# Patient Record
Sex: FEMALE | Race: BLACK OR AFRICAN AMERICAN | NOT HISPANIC OR LATINO | Employment: OTHER | RURAL
[De-identification: names, ages, dates, MRNs, and addresses within clinical notes are randomized per-mention and may not be internally consistent; named-entity substitution may affect disease eponyms.]

---

## 2023-12-31 ENCOUNTER — OFFICE VISIT (OUTPATIENT)
Dept: FAMILY MEDICINE | Facility: CLINIC | Age: 78
End: 2023-12-31
Payer: MEDICARE

## 2023-12-31 VITALS
WEIGHT: 137 LBS | BODY MASS INDEX: 21.5 KG/M2 | HEIGHT: 67 IN | SYSTOLIC BLOOD PRESSURE: 120 MMHG | OXYGEN SATURATION: 98 % | TEMPERATURE: 99 F | DIASTOLIC BLOOD PRESSURE: 73 MMHG | RESPIRATION RATE: 18 BRPM | HEART RATE: 88 BPM

## 2023-12-31 DIAGNOSIS — R93.6 ABNORMAL X-RAY OF KNEE: Primary | ICD-10-CM

## 2023-12-31 DIAGNOSIS — M19.031 ARTHRITIS OF RIGHT WRIST: ICD-10-CM

## 2023-12-31 DIAGNOSIS — M25.531 RIGHT WRIST PAIN: ICD-10-CM

## 2023-12-31 DIAGNOSIS — M25.461 EFFUSION, RIGHT KNEE: ICD-10-CM

## 2023-12-31 DIAGNOSIS — Z11.59 ENCOUNTER FOR SCREENING FOR OTHER VIRAL DISEASES: Primary | ICD-10-CM

## 2023-12-31 DIAGNOSIS — M25.561 ACUTE PAIN OF RIGHT KNEE: ICD-10-CM

## 2023-12-31 DIAGNOSIS — R05.9 COUGH, UNSPECIFIED TYPE: ICD-10-CM

## 2023-12-31 LAB
CTP QC/QA: YES
CTP QC/QA: YES
FLUAV AG NPH QL: NEGATIVE
FLUBV AG NPH QL: NEGATIVE
SARS-COV-2 RDRP RESP QL NAA+PROBE: NEGATIVE

## 2023-12-31 PROCEDURE — 87635 SARS-COV-2 COVID-19 AMP PRB: CPT | Mod: RHCUB | Performed by: NURSE PRACTITIONER

## 2023-12-31 PROCEDURE — 99204 OFFICE O/P NEW MOD 45 MIN: CPT | Mod: ,,, | Performed by: NURSE PRACTITIONER

## 2023-12-31 PROCEDURE — 87804 INFLUENZA ASSAY W/OPTIC: CPT | Mod: QW,RHCUB | Performed by: NURSE PRACTITIONER

## 2023-12-31 PROCEDURE — 99204 PR OFFICE/OUTPT VISIT, NEW, LEVL IV, 45-59 MIN: ICD-10-PCS | Mod: ,,, | Performed by: NURSE PRACTITIONER

## 2023-12-31 RX ORDER — BENZONATATE 200 MG/1
200 CAPSULE ORAL 3 TIMES DAILY PRN
Qty: 30 CAPSULE | Refills: 0 | Status: SHIPPED | OUTPATIENT
Start: 2023-12-31 | End: 2024-01-10

## 2023-12-31 NOTE — PROGRESS NOTES
"Subjective:       Patient ID: Karina Eddy is a 78 y.o. female.    Chief Complaint: Cough (Ongoing cough since noah /Pt is being swab for covid/flu while in clinic ), Wrist Pain (R wrist /Pt requesting to get a xray while in clinic/Pt denies injury ), and Knee Pain (R knee pain /Pt requesting to get a xray while in clinic/)    Presents to clinic with daughter as above. Has had a dry cough for a week. States sometimes has sinus drainage. No fever. No GERD. Denies SOB. Also c/o right wrist and right knee pain. Thinks may have bumped wrist a few weeks ago. Knee pain has been going on for a while no injury.       Review of Systems   Constitutional: Negative.    HENT:  Positive for congestion. Negative for sore throat.    Respiratory:  Positive for cough.    Cardiovascular: Negative.    Musculoskeletal:  Positive for joint pain.          Reviewed family, medical, surgical, and social history.    Objective:      /73 (BP Location: Left arm, Patient Position: Sitting, BP Method: Small (Manual))   Pulse 88   Temp 98.8 °F (37.1 °C) (Oral)   Resp 18   Ht 5' 7" (1.702 m)   Wt 62.1 kg (137 lb)   SpO2 98%   BMI 21.46 kg/m²   Physical Exam  Vitals and nursing note reviewed.   Constitutional:       General: She is not in acute distress.     Appearance: Normal appearance. She is not ill-appearing, toxic-appearing or diaphoretic.   HENT:      Head: Normocephalic.      Right Ear: Tympanic membrane, ear canal and external ear normal.      Left Ear: Tympanic membrane, ear canal and external ear normal.      Nose: Nose normal.      Mouth/Throat:      Mouth: Mucous membranes are moist.      Pharynx: No oropharyngeal exudate or posterior oropharyngeal erythema.   Cardiovascular:      Rate and Rhythm: Normal rate and regular rhythm.      Heart sounds: Normal heart sounds.   Pulmonary:      Effort: Pulmonary effort is normal. No respiratory distress.      Breath sounds: Normal breath sounds. No stridor. No wheezing, rhonchi " or rales.   Chest:      Chest wall: No tenderness.   Musculoskeletal:      Right wrist: Tenderness present. No swelling, deformity, effusion, lacerations, bony tenderness, snuff box tenderness or crepitus. Normal range of motion. Normal pulse.      Cervical back: Normal range of motion and neck supple.      Right knee: Swelling, effusion, bony tenderness and crepitus present. No deformity, erythema, ecchymosis or lacerations. Decreased range of motion.      Comments: Mild tenderness on medial right wrist. No redness/swelling/bruising. No redness/wamrth/bruising to right knee. Probable effusion lateral and medial patella.    Skin:     General: Skin is warm and dry.      Capillary Refill: Capillary refill takes less than 2 seconds.   Neurological:      Mental Status: She is alert and oriented to person, place, and time.   Psychiatric:         Mood and Affect: Mood normal.         Behavior: Behavior normal.         Thought Content: Thought content normal.         Judgment: Judgment normal.            Office Visit on 12/31/2023   Component Date Value Ref Range Status    Rapid Influenza A Ag 12/31/2023 Negative  Negative Final    Rapid Influenza B Ag 12/31/2023 Negative  Negative Final     Acceptable 12/31/2023 Yes   Final    POC Rapid COVID 12/31/2023 Negative  Negative Final     Acceptable 12/31/2023 Yes   Final      Assessment:       1. Encounter for screening for other viral diseases    2. Cough, unspecified type    3. Right wrist pain    4. Acute pain of right knee        Plan:       Encounter for screening for other viral diseases  -     POCT Influenza A/B  -     POCT COVID-19 Rapid Screening    Cough, unspecified type  -     X-Ray Chest PA And Lateral; Future; Expected date: 12/31/2023  -     benzonatate (TESSALON) 200 MG capsule; Take 1 capsule (200 mg total) by mouth 3 (three) times daily as needed for Cough.  Dispense: 30 capsule; Refill: 0    Right wrist pain  -     X-Ray Wrist  Complete 3 views Right; Future; Expected date: 12/31/2023    Acute pain of right knee  -     X-Ray Knee 1 or 2 View Right; Future; Expected date: 12/31/2023    I will call with xray results. Phone number verified.  RTC PRN          Risks, benefits, and side effects were discussed with the patient. All questions were answered to the fullest satisfaction of the patient, and pt verbalized understanding and agreement to treatment plan. Pt was to call with any new or worsening symptoms, or present to the ER.

## 2024-01-08 ENCOUNTER — OFFICE VISIT (OUTPATIENT)
Dept: ORTHOPEDICS | Facility: CLINIC | Age: 79
End: 2024-01-08
Payer: MEDICARE

## 2024-01-08 DIAGNOSIS — M17.11 PRIMARY OSTEOARTHRITIS OF RIGHT KNEE: Primary | ICD-10-CM

## 2024-01-08 DIAGNOSIS — M19.031 ARTHRITIS OF RIGHT WRIST: ICD-10-CM

## 2024-01-08 DIAGNOSIS — R93.6 ABNORMAL X-RAY OF KNEE: ICD-10-CM

## 2024-01-08 DIAGNOSIS — M65.4 DE QUERVAIN'S TENOSYNOVITIS, RIGHT: ICD-10-CM

## 2024-01-08 DIAGNOSIS — M25.461 EFFUSION, RIGHT KNEE: ICD-10-CM

## 2024-01-08 PROCEDURE — 99213 OFFICE O/P EST LOW 20 MIN: CPT | Mod: PBBFAC,25 | Performed by: NURSE PRACTITIONER

## 2024-01-08 PROCEDURE — 99213 OFFICE O/P EST LOW 20 MIN: CPT | Mod: 25,S$PBB,, | Performed by: NURSE PRACTITIONER

## 2024-01-08 PROCEDURE — 20610 DRAIN/INJ JOINT/BURSA W/O US: CPT | Mod: PBBFAC | Performed by: NURSE PRACTITIONER

## 2024-01-08 NOTE — PATIENT INSTRUCTIONS
Safety guidelines and activity restrictions discussed with the patient.  She verbalized understanding.  She was scheduled for CT examination of her knee on 01/16/2024.  Have her keep that appointment.  The physical exam does not give concern for tibial plateau fracture.  She was offered arthrocentesis of her right knee wished proceed.  Arthrocentesis performed yielding 90 cc of xanthochromic fluid.  We will place her in a thumb spica splint on the right.  Once we get CT results we will have return to orthopedic clinic and discussed possible intervention for de Quervain tenosynovitis with Dr. Colton Stevenson.

## 2024-01-08 NOTE — PROGRESS NOTES
79-year-old female presents ambulatory orthopedic clinic complaint of pain in her right knee.  Also complained of pain in her right wrist.  Wrist has been hurting for proximally 1 year.  The knee has been intermittent over the last several months.  She was seen by her primary care provider on 12/31/2023 where x-rays were obtained of the right knee AP and lateral view which showed severe DJD changes.  Radiologist raises the question of possible tibial plateau fracture.  She was scheduled for CT of her knee and referred to orthopedics.  She was not had CT performed as of yet.  X-rays of her right wrist at same date shows moderate DJD changes.  No evidence of acute fracture, dislocation or pathologic bone noted.      PE:  Physical exam she is noted be ambulating with a slight antalgic limp on the right.  In general she is awake, alert oriented appropriate.  No acute distress noted.  Respirations even unlabored.  Skin is warm dry and intact.  Physical exam of the right knee skin is warm dry and intact.  There has no soft tissue swelling noted.  There is a 2+ intra-articular effusion appreciated clinically.  Range motion right knee 0° extension with further flexion approximately 110°.  Valgus stress test is negative.  Varus stress test negative.  Anterior drawer test negative.  No tenderness palpation quad tendon.  There has no tenderness palpation patella tendon.  There has no tenderness to palpation of the medial joint line.  There has no tenderness palpation lateral joint line.  There has no tenderness to palpation about the proximal lateral tibial plateau region.  Physical exam of the right wrist she has limited range of motion of both wrist bilaterally.  Right radial pulse is 2 out of 4.  Finkelstein test reproduces pain symptoms on the right.  There is tenderness to palpation over the 1st extensor dorsal compartment of the right.    Impression:  1.)  DJD knee-right 2.)  De Quervain tenosynovitis-right    Plan:   Safety guidelines and activity restrictions discussed with the patient.  She verbalized understanding.  She was scheduled for CT examination of her knee on 01/16/2024.  Have her keep that appointment.  The physical exam does not give concern for tibial plateau fracture.  She was offered arthrocentesis of her right knee wished proceed.  Arthrocentesis performed yielding 90 cc of xanthochromic fluid.  We will place her in a thumb spica splint on the right.  Once we get CT results we will have return to orthopedic clinic and discussed possible intervention for de Quervain tenosynovitis with Dr. Colton Stevenson.

## 2024-01-08 NOTE — PROCEDURES
Large Joint Aspiration/Injection: R knee    Date/Time: 1/8/2024 1:00 PM    Performed by: Albino Montalvo FNP  Authorized by: Albino Montalvo FNP    Consent Done?:  Yes (Verbal)  Indications:  Pain  Site marked: the procedure site was marked    Timeout: prior to procedure the correct patient, procedure, and site was verified      Local anesthesia used?: Yes    Local anesthetic:  Topical anesthetic    Details:  Needle Size:  18 G  Approach:  Lateral  Location:  Knee  Site:  R knee  Aspirate amount (mL):  90  Aspirate:  Clear and yellow  Patient tolerance:  Patient tolerated the procedure well with no immediate complications     Right knee prepped with Betadine

## 2024-01-16 ENCOUNTER — HOSPITAL ENCOUNTER (OUTPATIENT)
Dept: RADIOLOGY | Facility: HOSPITAL | Age: 79
Discharge: HOME | End: 2024-01-16
Attending: NURSE PRACTITIONER
Payer: MEDICARE

## 2024-01-16 DIAGNOSIS — R93.6 ABNORMAL X-RAY OF KNEE: ICD-10-CM

## 2024-01-16 DIAGNOSIS — M25.461 EFFUSION, RIGHT KNEE: ICD-10-CM

## 2024-01-16 PROCEDURE — 73700 CT LOWER EXTREMITY W/O DYE: CPT | Mod: 26,RT,, | Performed by: STUDENT IN AN ORGANIZED HEALTH CARE EDUCATION/TRAINING PROGRAM

## 2024-01-16 PROCEDURE — 73700 CT LOWER EXTREMITY W/O DYE: CPT | Mod: TC,RT

## 2024-01-18 ENCOUNTER — OFFICE VISIT (OUTPATIENT)
Dept: ORTHOPEDICS | Facility: CLINIC | Age: 79
End: 2024-01-18
Payer: MEDICARE

## 2024-01-18 ENCOUNTER — HOSPITAL ENCOUNTER (OUTPATIENT)
Dept: RADIOLOGY | Facility: HOSPITAL | Age: 79
Discharge: HOME | End: 2024-01-18
Attending: NURSE PRACTITIONER
Payer: MEDICARE

## 2024-01-18 VITALS — WEIGHT: 137 LBS | HEIGHT: 67 IN | BODY MASS INDEX: 21.5 KG/M2

## 2024-01-18 DIAGNOSIS — R22.41 MASS OF RIGHT KNEE: ICD-10-CM

## 2024-01-18 DIAGNOSIS — M89.8X5 PAIN IN RIGHT FEMUR: Primary | ICD-10-CM

## 2024-01-18 DIAGNOSIS — M65.4 DE QUERVAIN'S TENOSYNOVITIS, RIGHT: Primary | ICD-10-CM

## 2024-01-18 DIAGNOSIS — Z13.9 ENCOUNTER FOR SCREENING: Primary | ICD-10-CM

## 2024-01-18 DIAGNOSIS — M89.8X5 PAIN IN RIGHT FEMUR: ICD-10-CM

## 2024-01-18 DIAGNOSIS — R93.89 ABNORMAL CT SCAN: ICD-10-CM

## 2024-01-18 PROCEDURE — 73552 X-RAY EXAM OF FEMUR 2/>: CPT | Mod: TC,RT

## 2024-01-18 PROCEDURE — 20550 NJX 1 TENDON SHEATH/LIGAMENT: CPT | Mod: PBBFAC,RT | Performed by: ORTHOPAEDIC SURGERY

## 2024-01-18 PROCEDURE — 99999PBSHW PR PBB SHADOW TECHNICAL ONLY FILED TO HB: Mod: PBBFAC,,,

## 2024-01-18 PROCEDURE — 73552 X-RAY EXAM OF FEMUR 2/>: CPT | Mod: 26,RT,, | Performed by: STUDENT IN AN ORGANIZED HEALTH CARE EDUCATION/TRAINING PROGRAM

## 2024-01-18 PROCEDURE — 99212 OFFICE O/P EST SF 10 MIN: CPT | Mod: PBBFAC | Performed by: ORTHOPAEDIC SURGERY

## 2024-01-18 PROCEDURE — 99213 OFFICE O/P EST LOW 20 MIN: CPT | Mod: PBBFAC,25 | Performed by: NURSE PRACTITIONER

## 2024-01-18 PROCEDURE — 99499 UNLISTED E&M SERVICE: CPT | Mod: S$PBB,,, | Performed by: NURSE PRACTITIONER

## 2024-01-18 PROCEDURE — 99213 OFFICE O/P EST LOW 20 MIN: CPT | Mod: S$PBB,25,, | Performed by: ORTHOPAEDIC SURGERY

## 2024-01-18 RX ORDER — TRIAMCINOLONE ACETONIDE 40 MG/ML
40 INJECTION, SUSPENSION INTRA-ARTICULAR; INTRAMUSCULAR
Status: DISCONTINUED | OUTPATIENT
Start: 2024-01-18 | End: 2024-01-18 | Stop reason: HOSPADM

## 2024-01-18 RX ADMIN — TRIAMCINOLONE ACETONIDE 40 MG: 400 INJECTION, SUSPENSION INTRA-ARTICULAR; INTRAMUSCULAR at 02:01

## 2024-01-18 NOTE — PROGRESS NOTES
79-year-old female presents back today for CT results of her right knee.  CT showed DJD changes.  She also has complaint of pain in her right femur.  She denies any injury or trauma.  She also has de Quervain's tenosynovitis is interested in talking to Dr. Colton Stevenson about possible surgical intervention.      X-ray: X-rays today of the right femur AP lateral view shows no evidence acute fracture, dislocation pathologic bone.      PE:  Physical exam she noted be ambulating independent no perceptible limp.  Physical exam right lower extremity she has good motion range of her hip without elicitation of pain.  Straight leg raise is negative.  There has no tenderness palpation about the thigh.    Impression:  1.)  De Quervain's tenosynovitis 2.)  Right thigh pain     Plan:  Safety guidelines activity restrictions cusp patient.  Verbalized understanding.  We will schedule her to see Dr. Colton Stevenson today to discuss possible surgical intervention.

## 2024-01-18 NOTE — PATIENT INSTRUCTIONS
Safety guidelines activity restrictions cusp patient.  Verbalized understanding.  We will schedule her to see Dr. Colton Stevenson today to discuss possible surgical intervention.

## 2024-01-18 NOTE — PROGRESS NOTES
79 y.o. Female returns to clinic for a follow up visit regarding     ICD-10-CM ICD-9-CM   1. De Quervain's tenosynovitis, right  M65.4 727.04        Patient saw Albino earlier today. She is here to discuss possible treatment options.   She has been wearing a removable brace since Oct.        No past medical history on file.  No past surgical history on file.      PHYSICAL EXAMINATION:                Right Hand/Wrist Exam     Pain   Hand - The patient exhibits pain of the extensory musculature.    Tests   Finkelstein's test: positive  Carpal Tunnel Compression Test: negative  Cubital Tunnel Compression Test: negative      Other     Neuorologic Exam    Median Distribution: normal  Ulnar Distribution: normal  Radial Distribution: normal          Vascular Exam       Capillary Refill  Right Hand: normal capillary refill            IMAGING:  X-Ray Femur 2 View Right    Result Date: 1/18/2024  EXAMINATION: XR FEMUR 2 VIEW RIGHT CLINICAL HISTORY: Other specified disorders of bone, thigh TECHNIQUE: XR FEMUR 2 VIEW RIGHT COMPARISON: 2024 FINDINGS: No acute fracture or dislocation. Mild degenerative change of the right hip.  Moderate tricompartmental degenerative change of the right knee.  Moderate knee joint effusion. No radiopaque foreign bodies.     No acute fracture or dislocation. Mild degenerative change of the right hip. Moderate tricompartmental degenerative change of the right knee.  Moderate knee joint effusion. Electronically signed by: José Luis Cisneros Date:    01/18/2024 Time:    14:35    CT Knee Without Contrast Right    Result Date: 1/16/2024  EXAMINATION: CT KNEE WITHOUT CONTRAST RIGHT CLINICAL HISTORY: Fracture, knee;Abnormal findings on diagnostic imaging of limbs TECHNIQUE: CT KNEE WITHOUT CONTRAST RIGHT COMPARISON: 12/31/2023 FINDINGS: Moderate narrowing of the medial and lateral compartments.  Mild-to-moderate narrowing of the patellofemoral compartments.  Productive change of the 3 compartments of the  right knee. Subchondral cystic change located about the midline of the proximal tibia near the tibial eminence.  The superior aspect of the subchondral cyst located near the tibial eminence is fractured although this could be chronic in etiology, series 4, image 34. Moderately sized knee joint effusion. The ACL is poorly visualized.  The PCL is intact.  The menisci are extruded bilaterally. Soft tissue thickening located within the proximal diaphysis of the proximal tibia measuring 2.0 x 1.1 x 1.6 cm, series 4, image 29. 6 cm Baker's cyst. Calcified vascular disease.  No fluid collection.     Soft tissue thickening located within the proximal diaphysis of the proximal tibia measuring 2.0 x 1.1 x 1.6 cm, series 4, image 29.  MRI of the right knee without and with intravenous contrast recommended. Subchondral cystic change located about the midline of the proximal tibia near the tibial eminence.  The superior aspect of the subchondral cyst located near the tibial eminence is fractured although this could be chronic in etiology, series 4, image 34. The ACL is poorly visualized. The PCL is intact. The menisci are extruded bilaterally. Moderate narrowing of the medial and lateral compartments.  Mild-to-moderate narrowing of the patellofemoral compartments.  Productive change of the 3 compartments of the right knee. Moderately sized knee joint effusion. Electronically signed by: José Luis Cisneros Date:    01/16/2024 Time:    15:48    X-Ray Knee 1 or 2 View Right    Result Date: 12/31/2023  EXAMINATION: XR KNEE 1 OR 2 VIEW RIGHT CLINICAL HISTORY: Pain in right knee TECHNIQUE: XR KNEE 1 OR 2 VIEW RIGHT COMPARISON: None FINDINGS: Questionable nondisplaced fracture involving the lateral tibial plateau, CT of the right knee recommended. Moderate to severe degenerative change of the right knee No radiopaque foreign bodies.     Questionable nondisplaced fracture involving the lateral tibial plateau, CT of the right knee  recommended. Moderate right knee joint effusion Moderate to severe degenerative change of the right knee Electronically signed by: José Luis Cisneros Date:    12/31/2023 Time:    13:13    X-Ray Chest PA And Lateral    Result Date: 12/31/2023  EXAMINATION: XR CHEST PA AND LATERAL CLINICAL HISTORY: Cough, unspecified TECHNIQUE: XR CHEST PA AND LATERAL COMPARISON: None FINDINGS: No lines or tubes. Lungs are clear. Normal pleura. Cardiac silhouette is normal No obvious acute bone findings.     No acute pulmonary disease Electronically signed by: José Luis Cisneros Date:    12/31/2023 Time:    12:54    X-Ray Wrist Complete 3 views Right    Result Date: 12/31/2023  EXAMINATION: XR WRIST COMPLETE 3 VIEWS RIGHT CLINICAL HISTORY: Pain in right wrist TECHNIQUE: XR WRIST COMPLETE 3 VIEWS RIGHT COMPARISON: None FINDINGS: No acute fracture or dislocation. Mild-to-moderate degenerative change of the wrist No radiopaque foreign bodies.     No acute findings Mild to moderate degenerative change of the wrist Electronically signed by: José Luis Cisneros Date:    12/31/2023 Time:    12:53       ASSESSMENT:      ICD-10-CM ICD-9-CM   1. De Quervain's tenosynovitis, right  M65.4 727.04       PLAN:     -Findings and treatment options were discussed with the patient  -All questions answered      Injection given today for right de Quervain tenosynovitis.  If no better in 4 weeks consider open release.  Risks benefits alternatives were discussed in great detail she voiced her understanding.  We will see back if pain symptoms persist    There are no Patient Instructions on file for this visit.      Orders Placed This Encounter   Procedures    Tendon Sheath         Procedures

## 2024-01-22 ENCOUNTER — TELEPHONE (OUTPATIENT)
Dept: FAMILY MEDICINE | Facility: CLINIC | Age: 79
End: 2024-01-22
Payer: MEDICARE

## 2024-01-22 NOTE — TELEPHONE ENCOUNTER
Notified daughter Anthony Watts of appt for MRI on 2/1/24 at 1300 at Imaging center. Voiced agreement.

## 2024-02-05 DIAGNOSIS — R22.41 MASS OF RIGHT KNEE: Primary | ICD-10-CM

## 2024-02-05 DIAGNOSIS — Z85.3 HISTORY OF BREAST CANCER: ICD-10-CM

## 2024-03-05 ENCOUNTER — OFFICE VISIT (OUTPATIENT)
Dept: ORTHOPEDICS | Facility: CLINIC | Age: 79
End: 2024-03-05
Payer: MEDICARE

## 2024-03-05 DIAGNOSIS — M17.11 PRIMARY OSTEOARTHRITIS OF RIGHT KNEE: Primary | ICD-10-CM

## 2024-03-05 PROCEDURE — 99213 OFFICE O/P EST LOW 20 MIN: CPT | Mod: S$PBB,25,, | Performed by: ORTHOPAEDIC SURGERY

## 2024-03-05 PROCEDURE — 99212 OFFICE O/P EST SF 10 MIN: CPT | Mod: PBBFAC,25 | Performed by: ORTHOPAEDIC SURGERY

## 2024-03-05 PROCEDURE — 99999PBSHW PR PBB SHADOW TECHNICAL ONLY FILED TO HB: Mod: JZ,PBBFAC,,

## 2024-03-05 PROCEDURE — 20610 DRAIN/INJ JOINT/BURSA W/O US: CPT | Mod: PBBFAC | Performed by: ORTHOPAEDIC SURGERY

## 2024-03-05 RX ORDER — BUPIVACAINE HYDROCHLORIDE 5 MG/ML
3 INJECTION, SOLUTION PERINEURAL
Status: DISCONTINUED | OUTPATIENT
Start: 2024-03-05 | End: 2024-03-05 | Stop reason: HOSPADM

## 2024-03-05 RX ADMIN — Medication 48 MG: at 11:03

## 2024-03-05 RX ADMIN — BUPIVACAINE HYDROCHLORIDE 3 ML: 5 INJECTION, SOLUTION PERINEURAL at 11:03

## 2024-03-05 NOTE — PROGRESS NOTES
ASSESSMENT:      ICD-10-CM ICD-9-CM   1. Primary osteoarthritis of right knee  M17.11 715.16       PLAN:     Findings and treatment options were discussed with the patient regarding the diagnosis.   All questions were answered regarding Karina Eddy 's painful knee.      Natural history and expected course discussed. Questions answered. Educational materials distributed. Rest, ice, compression, and elevation (RICE) therapy. Arthrocentesis. See procedure note.  Synvisc given today please see the attached procedure note.  Will follow up as needed.  There are no Patient Instructions on file for this visit.    IMAGING:   I reviewed her MRI which demonstrates an enchondroma this was a benign lesion she has severe tricompartmental osteoarthritis.      CC: Knee pain    79 y.o. Female who presents as a new patient to me for evaluation of  right knee pain.    Occupation: retired  Pain has been present for quite a while. She saw OSEI Dennison in Jan. At that visit he was able to draw fluid off her knee.   She reports that she now has pain radiating into her thigh.  Injury description No specific injury.   Mechanical symptoms, such as clicking, locking, and popping are present.    Swelling and effusions  are present.   The patient does  describe symptoms of knee instability, such as the knee buckling and the knee giving way.   Symptoms are worsened with activity.  Better with rest. Treatment thus far has included rest, activity modifications, and oral medications.    she  has not had formal physical therapy.  she has not had prior injections injections into the knee.   she has had previous advanced imaging such as MRI.     Here today to discuss diagnosis and treatment options.          REVIEW OF SYSTEMS:   Constitution: Negative. Negative for chills, fever and night sweats.    Hematologic/Lymphatic: Negative for bleeding problem. Does not bruise/bleed easily.   Skin: Negative for dry skin, itching and rash.    Musculoskeletal: Negative for falls. Positive for knee pain and muscle weakness.     All other review of symptoms were reviewed and found to be noncontributory.     PAST MEDICAL HISTORY:   History reviewed. No pertinent past medical history.    PAST SURGICAL HISTORY:   History reviewed. No pertinent surgical history.    FAMILY HISTORY:   History reviewed. No pertinent family history.    SOCIAL HISTORY:   Social History     Socioeconomic History    Marital status:    Tobacco Use    Smoking status: Never    Smokeless tobacco: Never       MEDICATIONS:   No current outpatient medications on file.    ALLERGIES:   Review of patient's allergies indicates:  No Known Allergies     PHYSICAL EXAMINATION:    General    Nursing note and vitals reviewed.  Constitutional: She is oriented to person, place, and time. She appears well-developed and well-nourished.   HENT:   Head: Normocephalic and atraumatic.   Nose: Nose normal.   Eyes: Pupils are equal, round, and reactive to light.   Neck: Neck supple.   Cardiovascular:  Normal rate, regular rhythm and intact distal pulses.            Pulmonary/Chest: Effort normal. No respiratory distress. She exhibits no tenderness.   Abdominal: Soft. She exhibits no distension. There is no abdominal tenderness.   Neurological: She is alert and oriented to person, place, and time. She has normal reflexes.   Psychiatric: She has a normal mood and affect. Her behavior is normal. Judgment and thought content normal.     General Musculoskeletal Exam   Gait: antalgic       Right Knee Exam     Inspection   Swelling: present  Effusion: present  Deformity: present    Tenderness   The patient is tender to palpation of the medial joint line.    Crepitus   The patient has crepitus of the patella and medial joint line.    Range of Motion   Extension:  abnormal   Flexion:  abnormal     Tests   Meniscus   Jean-Pierre:  Medial - positive   Ligament Examination   Lachman: normal (-1 to 2mm)    PCL-Posterior Drawer: normal (0 to 2mm)     MCL - Valgus: normal (0 to 2mm)  LCL - Varus: normal  Pivot Shift: normal (Equal)  Reverse Pivot Shift: normal (Equal)  Dial Test at 30 degrees: normal (< 5 degrees)  Dial Test at 90 degrees: normal (< 5 degrees)  Posterior Sag Test: negative  Posterolateral Corner: unstable (>15 degrees difference)  Patella   Patellar Tracking: normal  Q-Angle at 90 degrees: normal  Patellar Grind: positive    Other   Sensation: normal    Muscle Strength   Right Lower Extremity   Quadriceps:  5/5   Hamstrin/5     Reflexes     Right Side   Quadriceps:  2+    Vascular Exam     Right Pulses  Dorsalis Pedis:      2+  Posterior Tibial:      2+            Orders Placed This Encounter   Procedures    Large Joint Aspiration/Injection: R knee     This order was created via procedure documentation    Prior authorization Order     RIGHT KNEE     Order Specific Question:   What medications need authorization?     Answer:   SYNVISC-ONE [[id number]]     Order Specific Question:   Dose     Answer:   6 ML     Order Specific Question:   Frequency     Answer:   1     Order Specific Question:   J-Codes     Answer:   J [id number]       Procedures

## 2024-03-05 NOTE — PROCEDURES
Large Joint Aspiration/Injection: R knee    Date/Time: 3/5/2024 11:00 AM    Performed by: Colton Stevenson MD  Authorized by: Colton Stevenson MD    Consent Done?:  Yes (Verbal)  Indications:  Pain  Local anesthesia used?: No      Details:  Needle Size:  22 G  Approach:  Superior  Location:  Knee  Site:  R knee  Medications:  3 mL BUPivacaine 0.5 % (5 mg/mL); 48 mg hylan g-f 20 48 mg/6 mL  Patient tolerance:  Patient tolerated the procedure well with no immediate complications

## 2024-06-26 ENCOUNTER — HOSPITAL ENCOUNTER (OUTPATIENT)
Dept: RADIOLOGY | Facility: HOSPITAL | Age: 79
Discharge: HOME | End: 2024-06-26
Attending: EMERGENCY MEDICINE
Payer: MEDICARE

## 2024-06-26 ENCOUNTER — HOSPITAL ENCOUNTER (OUTPATIENT)
Dept: RADIOLOGY | Facility: HOSPITAL | Age: 79
Discharge: HOME | End: 2024-06-26
Attending: STUDENT IN AN ORGANIZED HEALTH CARE EDUCATION/TRAINING PROGRAM
Payer: MEDICARE

## 2024-06-26 ENCOUNTER — HOSPITAL ENCOUNTER (EMERGENCY)
Facility: HOSPITAL | Age: 79
Discharge: HOME | End: 2024-06-26
Attending: EMERGENCY MEDICINE
Payer: MEDICARE

## 2024-06-26 VITALS
WEIGHT: 123 LBS | SYSTOLIC BLOOD PRESSURE: 157 MMHG | DIASTOLIC BLOOD PRESSURE: 79 MMHG | RESPIRATION RATE: 22 BRPM | HEART RATE: 107 BPM | BODY MASS INDEX: 19.3 KG/M2 | TEMPERATURE: 99 F | OXYGEN SATURATION: 98 % | HEIGHT: 67 IN

## 2024-06-26 DIAGNOSIS — R92.8 ABNORMAL MAMMOGRAM: ICD-10-CM

## 2024-06-26 DIAGNOSIS — R92.8 ABNORMAL MAMMOGRAM: Primary | ICD-10-CM

## 2024-06-26 DIAGNOSIS — N63.10 MASS OF RIGHT BREAST, UNSPECIFIED QUADRANT: Primary | ICD-10-CM

## 2024-06-26 PROCEDURE — 99284 EMERGENCY DEPT VISIT MOD MDM: CPT | Mod: 25

## 2024-06-26 PROCEDURE — 76642 ULTRASOUND BREAST LIMITED: CPT | Mod: TC,RT

## 2024-06-26 PROCEDURE — 88377 M/PHMTRC ALYS ISHQUANT/SEMIQ: CPT | Mod: 26,,, | Performed by: PATHOLOGY

## 2024-06-26 PROCEDURE — 88305 TISSUE EXAM BY PATHOLOGIST: CPT | Mod: TC,91,SUR | Performed by: STUDENT IN AN ORGANIZED HEALTH CARE EDUCATION/TRAINING PROGRAM

## 2024-06-26 PROCEDURE — 88305 TISSUE EXAM BY PATHOLOGIST: CPT | Mod: 26,59,, | Performed by: PATHOLOGY

## 2024-06-26 PROCEDURE — 27000542 HC HANDPIECE BREAST BIOPSY, ANY

## 2024-06-26 PROCEDURE — 38505 NEEDLE BIOPSY LYMPH NODES: CPT

## 2024-06-26 PROCEDURE — 19083 BX BREAST 1ST LESION US IMAG: CPT | Mod: RT

## 2024-06-26 PROCEDURE — 27200940 HC BIOPSY TRAY

## 2024-06-26 PROCEDURE — A4648 IMPLANTABLE TISSUE MARKER: HCPCS

## 2024-06-26 PROCEDURE — 88305 TISSUE EXAM BY PATHOLOGIST: CPT | Mod: TC,SUR | Performed by: STUDENT IN AN ORGANIZED HEALTH CARE EDUCATION/TRAINING PROGRAM

## 2024-06-26 PROCEDURE — 27202044

## 2024-06-26 PROCEDURE — 88360 TUMOR IMMUNOHISTOCHEM/MANUAL: CPT | Mod: 26,,, | Performed by: PATHOLOGY

## 2024-06-26 PROCEDURE — 77061 BREAST TOMOSYNTHESIS UNI: CPT | Mod: TC,RT

## 2024-06-26 PROCEDURE — 88305 TISSUE EXAM BY PATHOLOGIST: CPT | Mod: 26,,, | Performed by: PATHOLOGY

## 2024-06-26 PROCEDURE — 88342 IMHCHEM/IMCYTCHM 1ST ANTB: CPT | Mod: 26,XU,, | Performed by: PATHOLOGY

## 2024-06-26 NOTE — ED PROVIDER NOTES
Encounter Date: 6/26/2024       History     Chief Complaint   Patient presents with    bleeding from R breast     States woke this am and had bleeding from under R breast -hx of breast CA with L breast Mastectomy     Patient is here complaining of some bleeding from her right breast since yesterday.  She 1st noticed this a month or 2 ago.  History is also taken from her daughter who lives in [place].  Her daughter plans on staying with her here until July 3rd or until have her how long it takes to get this under control.  Patient was not seen a doctor in years.  She has had about 20 lb weight loss over the past year according to her daughter he was just come back to visit her.  She was been doing well with good appetite.  Patient was had a prior mastectomy in the left breast back in the 1970s but she says it turned out retrospectively that she did not have breast cancer.      Review of patient's allergies indicates:  No Known Allergies  Past Medical History:   Diagnosis Date    Primary osteoarthritis of right knee 01/08/2024     History reviewed. No pertinent surgical history.  No family history on file.  Social History     Tobacco Use    Smoking status: Never    Smokeless tobacco: Never   Substance Use Topics    Alcohol use: Never    Drug use: Never     Review of Systems   Constitutional:  Negative for fever.   HENT:  Negative for sore throat.    Respiratory:  Negative for shortness of breath.    Cardiovascular:  Negative for chest pain.   Gastrointestinal:  Negative for nausea.   Genitourinary:  Negative for dysuria.   Musculoskeletal:  Negative for back pain.   Skin:  Negative for rash.   Neurological:  Negative for weakness.   Hematological:  Does not bruise/bleed easily.       Physical Exam     Initial Vitals [06/26/24 0553]   BP Pulse Resp Temp SpO2   (!) 157/79 107 (!) 22 99.2 °F (37.3 °C) 98 %      MAP       --         Physical Exam    Nursing note and vitals reviewed.  Constitutional: She appears  well-developed and well-nourished.   HENT:   Head: Normocephalic and atraumatic.   Eyes: EOM are normal. Pupils are equal, round, and reactive to light.   Neck: Neck supple. No thyromegaly present.   Normal range of motion.  Cardiovascular:  Normal rate, regular rhythm, normal heart sounds and intact distal pulses.           No murmur heard.  Pulmonary/Chest: Breath sounds normal. No respiratory distress. She has no wheezes.   Female CRNA chaperone present.  Patient has a large indurated and ulcerated mass on the right breast.  The indurated areas a proximally 6 cm with a about 2 cm ulceration.  Furthermore there is hard axillary lymphadenopathy.   Abdominal: Abdomen is soft. Bowel sounds are normal. She exhibits no distension. There is no abdominal tenderness.   Musculoskeletal:         General: No tenderness or edema. Normal range of motion.      Cervical back: Normal range of motion and neck supple.     Lymphadenopathy:     She has no cervical adenopathy.   Neurological: She is alert and oriented to person, place, and time. She has normal strength. No cranial nerve deficit or sensory deficit.   Skin: Skin is warm and dry. No rash noted.   Psychiatric: She has a normal mood and affect.         Medical Screening Exam   See Full Note    ED Course   Procedures  Labs Reviewed - No data to display       Imaging Results    None          Medications - No data to display  Medical Decision Making  Amount and/or Complexity of Data Reviewed  Independent Historian: caregiver  Discussion of management or test interpretation with external provider(s): Talked with general surgeon Dr. Méndez and radiologist Dr. Christian elliott.  This looks like breast cancer that is least metastasized to the axillary lymph nodes.  Patient was had some weight loss.  Will have her sent now to the imaging center for further testing.  Also have ambulatory referral to follow up with Dr. Méndez General surgery for further management based on the results of the  tests                                      Clinical Impression:   Final diagnoses:  [N63.10] Mass of right breast, unspecified quadrant (Primary)        ED Disposition Condition    Discharge Stable          ED Prescriptions    None       Follow-up Information       Follow up With Specialties Details Why Contact Info    Gil Méndez MD General Surgery, Surgery Schedule an appointment as soon as possible for a visit  Schedule an appointment.  Dr. Méndez said to follow up Thursday.  Call clinic to clarify whether that is tomorrow or next Thursday. [address] [place] [id number]  [phone]               Kar Jiang MD  06/26/24 0740

## 2024-06-26 NOTE — DISCHARGE INSTRUCTIONS
Go now to the imaging center.  You will need tests to include possibly mammogram ultrasound and biopsy.  But let the radiologist help you determine what you exactly need    Also you will then need to follow-up with Dr. Méndez general surgeon.

## 2024-06-27 ENCOUNTER — OFFICE VISIT (OUTPATIENT)
Dept: SURGERY | Facility: CLINIC | Age: 79
End: 2024-06-27
Attending: SURGERY
Payer: MEDICARE

## 2024-06-27 DIAGNOSIS — C50.911 MALIGNANT NEOPLASM OF RIGHT BREAST IN FEMALE, ESTROGEN RECEPTOR POSITIVE, UNSPECIFIED SITE OF BREAST: Primary | ICD-10-CM

## 2024-06-27 DIAGNOSIS — N63.10 MASS OF RIGHT BREAST, UNSPECIFIED QUADRANT: ICD-10-CM

## 2024-06-27 DIAGNOSIS — Z17.0 MALIGNANT NEOPLASM OF RIGHT BREAST IN FEMALE, ESTROGEN RECEPTOR POSITIVE, UNSPECIFIED SITE OF BREAST: Primary | ICD-10-CM

## 2024-06-27 LAB
DHEA SERPL-MCNC: NORMAL
ESTROGEN SERPL-MCNC: NORMAL PG/ML
INSULIN SERPL-ACNC: NORMAL U[IU]/ML
LAB AP CLINICAL INFORMATION: NORMAL
LAB AP GROSS DESCRIPTION: NORMAL
LAB AP LABORATORY NOTES: NORMAL
T3RU NFR SERPL: NORMAL %

## 2024-06-27 PROCEDURE — 99213 OFFICE O/P EST LOW 20 MIN: CPT | Mod: PBBFAC | Performed by: SURGERY

## 2024-06-27 PROCEDURE — 99999 PR PBB SHADOW E&M-EST. PATIENT-LVL III: CPT | Mod: PBBFAC,,, | Performed by: SURGERY

## 2024-06-27 PROCEDURE — 99204 OFFICE O/P NEW MOD 45 MIN: CPT | Mod: S$PBB,,, | Performed by: SURGERY

## 2024-06-27 NOTE — PROGRESS NOTES
General Surgery History and Physical      Patient ID: Karina Eddy is a 79 y.o. female.    Chief Complaint: Breast Problem      HPI:  79-year-old female with a history of breast cancer about 50 years ago status post left mastectomy.  She has not really been taking care of herself or seeing any doctors recently comes in with a bleeding fungating mass to the ER yesterday.  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  Mild discomfort in the area.  No seizures headaches back pain or any bone pain.    Review of Systems   Constitutional:  Negative for activity change, appetite change, fatigue and fever.   HENT:  Negative for trouble swallowing.    Respiratory:  Negative for cough and shortness of breath.    Cardiovascular:  Negative for chest pain and palpitations.   Gastrointestinal:  Negative for abdominal distention, abdominal pain, blood in stool, constipation and diarrhea.   Genitourinary:  Negative for flank pain.   Musculoskeletal:  Negative for neck pain and neck stiffness.   Neurological:  Negative for weakness.       No current outpatient medications on file.     No current facility-administered medications for this visit.       Review of patient's allergies indicates:  No Known Allergies    Past Medical History:   Diagnosis Date    Breast cancer     Primary osteoarthritis of right knee 01/08/2024       Past Surgical History:   Procedure Laterality Date    MASTECTOMY         Family History   Problem Relation Name Age of Onset    Breast cancer Sister         Social History     Socioeconomic History    Marital status:    Tobacco Use    Smoking status: Never    Smokeless tobacco: Never   Substance and Sexual Activity    Alcohol use: Never    Drug use: Never       There were no vitals filed for this visit.    Physical Exam  Constitutional:        General: She is not in acute distress.  HENT:      Head: Normocephalic.   Cardiovascular:      Rate and Rhythm: Normal rate and regular rhythm.      Pulses: Normal pulses.   Pulmonary:      Effort: Pulmonary effort is normal. No respiratory distress.      Breath sounds: Normal breath sounds.   Abdominal:      General: Abdomen is flat. There is no distension.      Palpations: Abdomen is soft.      Tenderness: There is no abdominal tenderness.   Musculoskeletal:         General: Normal range of motion.   Skin:     General: Skin is warm.      Findings: Lesion (Large fungating lesion right breast encompassing the majority of it with palpable lymphadenopathy at least 3 nodes) present.   Neurological:      General: No focal deficit present.      Mental Status: She is oriented to person, place, and time.     Result:   Mammo Digital Diagnostic Right with Scott  US Breast Right Limited     History:  Patient is 79 y.o. and is seen for right breast mass.        Films Compared:  Prior images (if available) were compared.     Findings:  This procedure was performed using tomosynthesis. Computer-aided detection was utilized in the interpretation of this examination.     Mammo Digital Diagnostic Right with Scott  The right breast is almost entirely fatty.  There is a 5.4 mm x 3.5 mm high density, irregularly shaped mass with microlobulated margins seen in the upper outer quadrant of the right breast in the posterior depth, 3 cm from the nipple on the MLO and CC views.      US Breast Right Limited  There is a 45 mm x 39 mm x 48 mm irregularly shaped, non-parallel, hypoechoic mass with microlobulated margins with shadowing seen in the right breast at 12 o'clock, 2 cm from the nipple.      Multiple enlarged right axillary lymph nodes with the largest measuring up to 1.2 cm.      Impression:     Right  Mass: Right breast 45 mm x 39 mm x 48 mm mass at the 12 o'clock position.   Multiple enlarged right axillary lymph nodes with the  largest measuring up to 1.2 cm.      Assessment: 4 - Suspicious finding. Biopsy is recommended.      BI-RADS Category:   Overall: 4 - Suspicious     Recommendation:  Biopsy is recommended.  +    inal Diagnosis   A. Right axillary lymph node, fine needle biopsy:    - Invasive ductal carcinoma  - No definitive lymph node tissue seen  - See comments       Assessment & Plan:    Malignant neoplasm of right breast in female, estrogen receptor positive, unspecified site of breast    Mass of right breast, unspecified quadrant  -     Ambulatory referral/consult to General Surgery  -     Ambulatory referral/consult to Hematology / Oncology; Future; Expected date: 07/04/2024     Patient will see Dr. Butterfield for palliative chemotherapy.  Explained to her in the family the overall course for the patient.  If they require a port they will call back and schedule set it up over the phone.  Risks and benefits explained all questions were answered.

## 2024-06-28 LAB
DHEA SERPL-MCNC: NORMAL
ESTRIOL SERPL-MCNC: NORMAL NG/ML
ESTROGEN SERPL-MCNC: NORMAL PG/ML
INSULIN SERPL-ACNC: NORMAL U[IU]/ML
LAB AP CLINICAL INFORMATION: NORMAL
LAB AP GROSS DESCRIPTION: NORMAL
LAB AP LABORATORY NOTES: NORMAL
LAB AP PREDICTIVE MARKER TESTING: NORMAL
T3RU NFR SERPL: NORMAL %

## 2024-06-28 NOTE — PROGRESS NOTES
Please make sure patient got appointment to see oncologist.  I see she was referred by the emergency room.  Thank you.

## 2024-07-09 DIAGNOSIS — Z71.89 COMPLEX CARE COORDINATION: ICD-10-CM

## 2024-10-30 DIAGNOSIS — M17.11 PRIMARY OSTEOARTHRITIS OF RIGHT KNEE: Primary | ICD-10-CM

## 2024-10-31 ENCOUNTER — OFFICE VISIT (OUTPATIENT)
Dept: ORTHOPEDICS | Facility: CLINIC | Age: 79
End: 2024-10-31
Payer: MEDICARE

## 2024-10-31 DIAGNOSIS — M17.11 PRIMARY OSTEOARTHRITIS OF RIGHT KNEE: Primary | ICD-10-CM

## 2024-10-31 PROCEDURE — 20610 DRAIN/INJ JOINT/BURSA W/O US: CPT | Mod: PBBFAC | Performed by: ORTHOPAEDIC SURGERY

## 2024-10-31 PROCEDURE — 99999PBSHW PR PBB SHADOW TECHNICAL ONLY FILED TO HB: Mod: JZ,PBBFAC,,

## 2024-10-31 PROCEDURE — 99212 OFFICE O/P EST SF 10 MIN: CPT | Mod: PBBFAC | Performed by: ORTHOPAEDIC SURGERY

## 2024-10-31 PROCEDURE — 99999 PR PBB SHADOW E&M-EST. PATIENT-LVL II: CPT | Mod: PBBFAC,,, | Performed by: ORTHOPAEDIC SURGERY

## 2024-10-31 RX ADMIN — TRIAMCINOLONE ACETONIDE 40 MG: 400 INJECTION, SUSPENSION INTRA-ARTICULAR; INTRAMUSCULAR at 10:10

## 2024-10-31 RX ADMIN — BUPIVACAINE HYDROCHLORIDE 3 ML: 5 INJECTION, SOLUTION PERINEURAL at 10:10

## 2024-10-31 RX ADMIN — Medication 48 MG: at 10:10

## 2024-10-31 NOTE — PROGRESS NOTES
CC:  Knee pain    79 y.o. Female returns to clinic for a follow up visit regarding knee pain.       Patient is here today for a Synvisc-One injection in her right knee.     She has had this injection in the past and it has helped        Past Medical History:   Diagnosis Date    Breast cancer     Primary osteoarthritis of right knee 01/08/2024     Past Surgical History:   Procedure Laterality Date    MASTECTOMY           PHYSICAL EXAMINATION:  There were no vitals taken for this visit.  General    Nursing note and vitals reviewed.  Constitutional: She is oriented to person, place, and time. She appears well-developed and well-nourished.   HENT:   Head: Normocephalic and atraumatic.   Nose: Nose normal.   Eyes: Pupils are equal, round, and reactive to light.   Neck: Neck supple.   Cardiovascular:  Normal rate, regular rhythm and intact distal pulses.            Pulmonary/Chest: Effort normal. No respiratory distress. She exhibits no tenderness.   Abdominal: Soft. She exhibits no distension. There is no abdominal tenderness.   Neurological: She is alert and oriented to person, place, and time. She has normal reflexes.   Psychiatric: She has a normal mood and affect. Her behavior is normal. Judgment and thought content normal.     General Musculoskeletal Exam   Gait: antalgic       Right Knee Exam     Inspection   Swelling: present    Tenderness   The patient is tender to palpation of the medial joint line and lateral joint line.    Crepitus   The patient has crepitus of the patella, medial joint line and lateral joint line.    Range of Motion   Extension:  abnormal   Flexion:  abnormal     Tests   Meniscus   Jean-Pierre:  Medial - positive   Ligament Examination   Lachman: normal (-1 to 2mm)   PCL-Posterior Drawer: normal (0 to 2mm)     MCL - Valgus: normal (0 to 2mm)  LCL - Varus: normal  Pivot Shift: normal (Equal)  Reverse Pivot Shift: normal (Equal)  Dial Test at 30 degrees: normal (< 5 degrees)  Dial Test at 90  degrees: normal (< 5 degrees)  Posterolateral Corner: stable  Patella   Patellar Tracking: normal  Q-Angle at 90 degrees: normal  Patellar Grind: positive    Other   Sensation: normal    Muscle Strength   Right Lower Extremity   Quadriceps:  5/5   Hamstrin/5     Reflexes     Right Side   Quadriceps:  2+    Vascular Exam     Right Pulses  Dorsalis Pedis:      2+  Posterior Tibial:      2+          IMAGING:  No results found.     ASSESSMENT:      ICD-10-CM ICD-9-CM   1. Primary osteoarthritis of right knee  M17.11 715.16       PLAN:     -Findings and treatment options were discussed with the patient  -All questions answered  Natural history and expected course discussed. Questions answered.  Educational materials distributed.  Rest, ice, compression, and elevation (RICE) therapy.  Arthrocentesis. See procedure note.      There are no Patient Instructions on file for this visit.      No orders of the defined types were placed in this encounter.        Large Joint Aspiration/Injection: R knee    Date/Time: 10/31/2024 10:15 AM    Performed by: Colton Stevenson MD  Authorized by: Colton Stevenson MD    Consent Done?:  Yes (Verbal)  Indications:  Pain  Local anesthesia used?: No      Details:  Needle Size:  22 G  Approach:  Superior  Location:  Knee  Site:  R knee  Medications:  3 mL BUPivacaine 0.5 % (5 mg/mL); 40 mg triamcinolone acetonide 40 mg/mL; 48 mg hylan g-f 20 48 mg/6 mL  Patient tolerance:  Patient tolerated the procedure well with no immediate complications

## 2024-11-01 RX ORDER — LETROZOLE 2.5 MG/1
TABLET, FILM COATED ORAL
COMMUNITY
Start: 2024-07-08

## 2024-11-04 ENCOUNTER — OFFICE VISIT (OUTPATIENT)
Dept: INTERNAL MEDICINE | Facility: CLINIC | Age: 79
End: 2024-11-04
Payer: MEDICARE

## 2024-11-04 VITALS
TEMPERATURE: 98 F | HEIGHT: 67 IN | RESPIRATION RATE: 16 BRPM | DIASTOLIC BLOOD PRESSURE: 80 MMHG | SYSTOLIC BLOOD PRESSURE: 150 MMHG | HEART RATE: 74 BPM | WEIGHT: 122 LBS | BODY MASS INDEX: 19.15 KG/M2 | OXYGEN SATURATION: 99 %

## 2024-11-04 DIAGNOSIS — I10 HYPERTENSION, UNSPECIFIED TYPE: ICD-10-CM

## 2024-11-04 DIAGNOSIS — C50.919 MALIGNANT NEOPLASM OF FEMALE BREAST, UNSPECIFIED ESTROGEN RECEPTOR STATUS, UNSPECIFIED LATERALITY, UNSPECIFIED SITE OF BREAST: ICD-10-CM

## 2024-11-04 DIAGNOSIS — Z00.00 ROUTINE GENERAL MEDICAL EXAMINATION AT A HEALTH CARE FACILITY: ICD-10-CM

## 2024-11-04 DIAGNOSIS — M17.11 PRIMARY OSTEOARTHRITIS OF RIGHT KNEE: Primary | ICD-10-CM

## 2024-11-04 PROCEDURE — 99204 OFFICE O/P NEW MOD 45 MIN: CPT | Mod: S$PBB,,, | Performed by: INTERNAL MEDICINE

## 2024-11-04 PROCEDURE — 99214 OFFICE O/P EST MOD 30 MIN: CPT | Mod: PBBFAC | Performed by: INTERNAL MEDICINE

## 2024-11-04 PROCEDURE — 99999 PR PBB SHADOW E&M-EST. PATIENT-LVL IV: CPT | Mod: PBBFAC,,, | Performed by: INTERNAL MEDICINE

## 2024-11-04 RX ORDER — METOPROLOL SUCCINATE 25 MG/1
25 TABLET, EXTENDED RELEASE ORAL DAILY
Qty: 90 TABLET | Refills: 3 | Status: SHIPPED | OUTPATIENT
Start: 2024-11-04 | End: 2025-11-04

## 2024-11-04 RX ORDER — BUPIVACAINE HYDROCHLORIDE 5 MG/ML
3 INJECTION, SOLUTION PERINEURAL
Status: DISCONTINUED | OUTPATIENT
Start: 2024-10-31 | End: 2024-11-04 | Stop reason: HOSPADM

## 2024-11-04 RX ORDER — TRIAMCINOLONE ACETONIDE 40 MG/ML
40 INJECTION, SUSPENSION INTRA-ARTICULAR; INTRAMUSCULAR
Status: DISCONTINUED | OUTPATIENT
Start: 2024-10-31 | End: 2024-11-04 | Stop reason: HOSPADM

## 2024-11-04 NOTE — PROGRESS NOTES
Subjective:       Patient ID: Karina Eddy is a 79 y.o. female.    Chief Complaint: [facility]    Sees dr gannon for breast cancer tx  and bp high reciently will start toprol daily 25  and see how mbp responds  no other complaints  Review of Systems   Constitutional:  Negative for fatigue and unexpected weight change.   HENT:  Negative for ear pain and goiter.    Respiratory:  Negative for chest tightness and shortness of breath.    Cardiovascular:  Negative for chest pain, palpitations and leg swelling.   Gastrointestinal:  Negative for abdominal pain and reflux.   Integumentary:  Negative for rash and breast tenderness.   Neurological:  Negative for dizziness and weakness.   Hematological:  Negative for adenopathy.   Psychiatric/Behavioral:  Negative for behavioral problems.    Breast: Negative for tenderness      Objective:      Physical Exam  Constitutional:       Appearance: Normal appearance.   HENT:      Head: Normocephalic and atraumatic.      Right Ear: External ear normal.      Left Ear: External ear normal.      Mouth/Throat:      Pharynx: Oropharynx is clear.   Eyes:      Extraocular Movements: Extraocular movements intact.      Pupils: Pupils are equal, round, and reactive to light.   Cardiovascular:      Rate and Rhythm: Normal rate and regular rhythm.      Pulses: Normal pulses.      Heart sounds: Normal heart sounds.   Pulmonary:      Effort: Pulmonary effort is normal.      Breath sounds: Normal breath sounds.   Abdominal:      General: Abdomen is flat. Bowel sounds are normal.      Palpations: Abdomen is soft.   Musculoskeletal:         General: Normal range of motion.      Cervical back: Normal range of motion and neck supple.   Skin:     General: Skin is warm.      Capillary Refill: Capillary refill takes less than 2 seconds.   Neurological:      General: No focal deficit present.      Mental Status: She is alert.   Psychiatric:         Mood and Affect: Mood normal.         Thought Content:  Thought content normal.         Judgment: Judgment normal.       Assessment:       1. Primary osteoarthritis of right knee    2. Routine general medical examination at a health care facility    3. Hypertension, unspecified type    4. Malignant neoplasm of female breast, unspecified estrogen receptor status, unspecified laterality, unspecified site of breast        Plan:         There are no Patient Instructions on file for this visit.      Problem List Items Addressed This Visit          Orthopedic    Primary osteoarthritis of right knee - Primary     Other Visit Diagnoses       Routine general medical examination at a health care facility        Hypertension, unspecified type        Malignant neoplasm of female breast, unspecified estrogen receptor status, unspecified laterality, unspecified site of breast

## 2024-11-19 ENCOUNTER — PATIENT MESSAGE (OUTPATIENT)
Dept: RESEARCH | Facility: HOSPITAL | Age: 79
End: 2024-11-19

## 2024-12-02 ENCOUNTER — OFFICE VISIT (OUTPATIENT)
Dept: INTERNAL MEDICINE | Facility: CLINIC | Age: 79
End: 2024-12-02
Payer: MEDICARE

## 2024-12-02 VITALS
WEIGHT: 119 LBS | OXYGEN SATURATION: 97 % | TEMPERATURE: 98 F | SYSTOLIC BLOOD PRESSURE: 140 MMHG | BODY MASS INDEX: 18.68 KG/M2 | HEIGHT: 67 IN | DIASTOLIC BLOOD PRESSURE: 68 MMHG | HEART RATE: 72 BPM | RESPIRATION RATE: 16 BRPM

## 2024-12-02 DIAGNOSIS — Z00.00 ROUTINE GENERAL MEDICAL EXAMINATION AT A HEALTH CARE FACILITY: ICD-10-CM

## 2024-12-02 DIAGNOSIS — I10 HYPERTENSION, UNSPECIFIED TYPE: ICD-10-CM

## 2024-12-02 DIAGNOSIS — C50.919 MALIGNANT NEOPLASM OF FEMALE BREAST, UNSPECIFIED ESTROGEN RECEPTOR STATUS, UNSPECIFIED LATERALITY, UNSPECIFIED SITE OF BREAST: ICD-10-CM

## 2024-12-02 DIAGNOSIS — M17.11 PRIMARY OSTEOARTHRITIS OF RIGHT KNEE: Primary | ICD-10-CM

## 2024-12-02 PROCEDURE — 99214 OFFICE O/P EST MOD 30 MIN: CPT | Mod: S$PBB,,, | Performed by: INTERNAL MEDICINE

## 2024-12-02 PROCEDURE — 99999 PR PBB SHADOW E&M-EST. PATIENT-LVL IV: CPT | Mod: PBBFAC,,, | Performed by: INTERNAL MEDICINE

## 2024-12-02 PROCEDURE — 99214 OFFICE O/P EST MOD 30 MIN: CPT | Mod: PBBFAC | Performed by: INTERNAL MEDICINE

## 2024-12-02 RX ORDER — MELOXICAM 15 MG/1
15 TABLET ORAL DAILY
Qty: 90 TABLET | Refills: 3 | Status: SHIPPED | OUTPATIENT
Start: 2024-12-02

## 2024-12-02 RX ORDER — LETROZOLE 2.5 MG/1
2.5 TABLET, FILM COATED ORAL DAILY
Qty: 90 TABLET | Refills: 3 | Status: SHIPPED | OUTPATIENT
Start: 2024-12-02

## 2024-12-02 RX ORDER — METOPROLOL SUCCINATE 50 MG/1
50 TABLET, EXTENDED RELEASE ORAL DAILY
Qty: 90 TABLET | Refills: 3 | Status: SHIPPED | OUTPATIENT
Start: 2024-12-02 | End: 2025-12-02

## 2024-12-02 NOTE — PROGRESS NOTES
Subjective:       Patient ID: Karina Eddy is a 79 y.o. female.    Chief Complaint: Follow-up (4wk)    Stiff and r knee hurts  bp too high will increase toprol to 50    Follow-up  Pertinent negatives include no abdominal pain, chest pain, fatigue, rash or weakness.   Review of Systems   Constitutional:  Negative for fatigue and unexpected weight change.   HENT:  Negative for ear pain and goiter.    Respiratory:  Negative for chest tightness and shortness of breath.    Cardiovascular:  Negative for chest pain, palpitations and leg swelling.   Gastrointestinal:  Negative for abdominal pain and reflux.   Integumentary:  Negative for rash and breast tenderness.   Neurological:  Negative for dizziness and weakness.   Hematological:  Negative for adenopathy.   Psychiatric/Behavioral:  Negative for behavioral problems.    Breast: Negative for tenderness      Objective:      Physical Exam  Constitutional:       Appearance: Normal appearance.   HENT:      Head: Normocephalic and atraumatic.      Right Ear: External ear normal.      Left Ear: External ear normal.      Mouth/Throat:      Pharynx: Oropharynx is clear.   Eyes:      Extraocular Movements: Extraocular movements intact.      Pupils: Pupils are equal, round, and reactive to light.   Cardiovascular:      Rate and Rhythm: Normal rate and regular rhythm.      Pulses: Normal pulses.      Heart sounds: Normal heart sounds.   Pulmonary:      Effort: Pulmonary effort is normal.      Breath sounds: Normal breath sounds.   Abdominal:      General: Abdomen is flat. Bowel sounds are normal.      Palpations: Abdomen is soft.   Musculoskeletal:         General: Normal range of motion.      Cervical back: Normal range of motion and neck supple.   Skin:     General: Skin is warm.      Capillary Refill: Capillary refill takes less than 2 seconds.   Neurological:      General: No focal deficit present.      Mental Status: She is alert.   Psychiatric:         Mood and Affect: Mood  normal.         Thought Content: Thought content normal.         Judgment: Judgment normal.       Assessment:       1. Primary osteoarthritis of right knee    2. Hypertension, unspecified type    3. Malignant neoplasm of female breast, unspecified estrogen receptor status, unspecified laterality, unspecified site of breast    4. Routine general medical examination at a health care facility        Plan:         Patient Instructions   Continue current meds and f/u 6 months      Problem List Items Addressed This Visit          Orthopedic    Primary osteoarthritis of right knee - Primary    Relevant Medications    meloxicam (MOBIC) 15 MG tablet     Other Visit Diagnoses       Hypertension, unspecified type        Relevant Medications    metoprolol succinate (TOPROL-XL) 50 MG 24 hr tablet    Malignant neoplasm of female breast, unspecified estrogen receptor status, unspecified laterality, unspecified site of breast        Routine general medical examination at a health care facility